# Patient Record
Sex: MALE | Race: BLACK OR AFRICAN AMERICAN | NOT HISPANIC OR LATINO | URBAN - METROPOLITAN AREA
[De-identification: names, ages, dates, MRNs, and addresses within clinical notes are randomized per-mention and may not be internally consistent; named-entity substitution may affect disease eponyms.]

---

## 2019-04-14 ENCOUNTER — EMERGENCY (EMERGENCY)
Facility: HOSPITAL | Age: 69
LOS: 1 days | Discharge: LEFT BEFORE DISCHARGE | End: 2019-04-14
Attending: EMERGENCY MEDICINE
Payer: COMMERCIAL

## 2019-04-14 VITALS
OXYGEN SATURATION: 96 % | SYSTOLIC BLOOD PRESSURE: 156 MMHG | DIASTOLIC BLOOD PRESSURE: 83 MMHG | RESPIRATION RATE: 20 BRPM | WEIGHT: 250 LBS | HEART RATE: 80 BPM | TEMPERATURE: 98 F | HEIGHT: 74 IN

## 2019-04-14 VITALS
DIASTOLIC BLOOD PRESSURE: 89 MMHG | TEMPERATURE: 98 F | SYSTOLIC BLOOD PRESSURE: 145 MMHG | OXYGEN SATURATION: 98 % | RESPIRATION RATE: 16 BRPM | HEART RATE: 82 BPM

## 2019-04-14 LAB
ALBUMIN SERPL ELPH-MCNC: 4.3 G/DL — SIGNIFICANT CHANGE UP (ref 3.3–5)
ALP SERPL-CCNC: 48 U/L — SIGNIFICANT CHANGE UP (ref 40–120)
ALT FLD-CCNC: 31 U/L — SIGNIFICANT CHANGE UP (ref 10–45)
ANION GAP SERPL CALC-SCNC: 15 MMOL/L — SIGNIFICANT CHANGE UP (ref 5–17)
APTT BLD: 30.4 SEC — SIGNIFICANT CHANGE UP (ref 27.5–36.3)
AST SERPL-CCNC: 34 U/L — SIGNIFICANT CHANGE UP (ref 10–40)
BASOPHILS # BLD AUTO: 0 K/UL — SIGNIFICANT CHANGE UP (ref 0–0.2)
BASOPHILS NFR BLD AUTO: 0.1 % — SIGNIFICANT CHANGE UP (ref 0–2)
BILIRUB SERPL-MCNC: 0.5 MG/DL — SIGNIFICANT CHANGE UP (ref 0.2–1.2)
BUN SERPL-MCNC: 14 MG/DL — SIGNIFICANT CHANGE UP (ref 7–23)
CALCIUM SERPL-MCNC: 9.6 MG/DL — SIGNIFICANT CHANGE UP (ref 8.4–10.5)
CHLORIDE SERPL-SCNC: 100 MMOL/L — SIGNIFICANT CHANGE UP (ref 96–108)
CO2 SERPL-SCNC: 25 MMOL/L — SIGNIFICANT CHANGE UP (ref 22–31)
CREAT SERPL-MCNC: 0.99 MG/DL — SIGNIFICANT CHANGE UP (ref 0.5–1.3)
EOSINOPHIL # BLD AUTO: 0 K/UL — SIGNIFICANT CHANGE UP (ref 0–0.5)
EOSINOPHIL NFR BLD AUTO: 0 % — SIGNIFICANT CHANGE UP (ref 0–6)
GLUCOSE SERPL-MCNC: 167 MG/DL — HIGH (ref 70–99)
HCT VFR BLD CALC: 49.4 % — SIGNIFICANT CHANGE UP (ref 39–50)
HGB BLD-MCNC: 16.1 G/DL — SIGNIFICANT CHANGE UP (ref 13–17)
INR BLD: 1.2 RATIO — HIGH (ref 0.88–1.16)
LYMPHOCYTES # BLD AUTO: 1.1 K/UL — SIGNIFICANT CHANGE UP (ref 1–3.3)
LYMPHOCYTES # BLD AUTO: 13.9 % — SIGNIFICANT CHANGE UP (ref 13–44)
MCHC RBC-ENTMCNC: 27 PG — SIGNIFICANT CHANGE UP (ref 27–34)
MCHC RBC-ENTMCNC: 32.6 GM/DL — SIGNIFICANT CHANGE UP (ref 32–36)
MCV RBC AUTO: 82.7 FL — SIGNIFICANT CHANGE UP (ref 80–100)
MONOCYTES # BLD AUTO: 0.3 K/UL — SIGNIFICANT CHANGE UP (ref 0–0.9)
MONOCYTES NFR BLD AUTO: 3.9 % — SIGNIFICANT CHANGE UP (ref 2–14)
NEUTROPHILS # BLD AUTO: 6.4 K/UL — SIGNIFICANT CHANGE UP (ref 1.8–7.4)
NEUTROPHILS NFR BLD AUTO: 82 % — HIGH (ref 43–77)
PLATELET # BLD AUTO: 200 K/UL — SIGNIFICANT CHANGE UP (ref 150–400)
POTASSIUM SERPL-MCNC: 4.1 MMOL/L — SIGNIFICANT CHANGE UP (ref 3.5–5.3)
POTASSIUM SERPL-SCNC: 4.1 MMOL/L — SIGNIFICANT CHANGE UP (ref 3.5–5.3)
PROT SERPL-MCNC: 7.9 G/DL — SIGNIFICANT CHANGE UP (ref 6–8.3)
PROTHROM AB SERPL-ACNC: 13.9 SEC — HIGH (ref 10–12.9)
RBC # BLD: 5.96 M/UL — HIGH (ref 4.2–5.8)
RBC # FLD: 13 % — SIGNIFICANT CHANGE UP (ref 10.3–14.5)
SODIUM SERPL-SCNC: 140 MMOL/L — SIGNIFICANT CHANGE UP (ref 135–145)
WBC # BLD: 7.8 K/UL — SIGNIFICANT CHANGE UP (ref 3.8–10.5)
WBC # FLD AUTO: 7.8 K/UL — SIGNIFICANT CHANGE UP (ref 3.8–10.5)

## 2019-04-14 PROCEDURE — 70496 CT ANGIOGRAPHY HEAD: CPT

## 2019-04-14 PROCEDURE — 96374 THER/PROPH/DIAG INJ IV PUSH: CPT | Mod: XU

## 2019-04-14 PROCEDURE — 70496 CT ANGIOGRAPHY HEAD: CPT | Mod: 26

## 2019-04-14 PROCEDURE — 70498 CT ANGIOGRAPHY NECK: CPT

## 2019-04-14 PROCEDURE — 70498 CT ANGIOGRAPHY NECK: CPT | Mod: 26

## 2019-04-14 PROCEDURE — 70450 CT HEAD/BRAIN W/O DYE: CPT

## 2019-04-14 PROCEDURE — 93010 ELECTROCARDIOGRAM REPORT: CPT

## 2019-04-14 PROCEDURE — 80053 COMPREHEN METABOLIC PANEL: CPT

## 2019-04-14 PROCEDURE — 99284 EMERGENCY DEPT VISIT MOD MDM: CPT | Mod: 25

## 2019-04-14 PROCEDURE — 85730 THROMBOPLASTIN TIME PARTIAL: CPT

## 2019-04-14 PROCEDURE — 85027 COMPLETE CBC AUTOMATED: CPT

## 2019-04-14 PROCEDURE — 85610 PROTHROMBIN TIME: CPT

## 2019-04-14 PROCEDURE — 93005 ELECTROCARDIOGRAM TRACING: CPT

## 2019-04-14 RX ORDER — MECLIZINE HCL 12.5 MG
25 TABLET ORAL ONCE
Qty: 0 | Refills: 0 | Status: COMPLETED | OUTPATIENT
Start: 2019-04-14 | End: 2019-04-14

## 2019-04-14 RX ORDER — SODIUM CHLORIDE 9 MG/ML
1000 INJECTION INTRAMUSCULAR; INTRAVENOUS; SUBCUTANEOUS ONCE
Qty: 0 | Refills: 0 | Status: COMPLETED | OUTPATIENT
Start: 2019-04-14 | End: 2019-04-14

## 2019-04-14 RX ORDER — MECLIZINE HCL 12.5 MG
1 TABLET ORAL
Qty: 6 | Refills: 0 | OUTPATIENT
Start: 2019-04-14 | End: 2019-04-16

## 2019-04-14 RX ORDER — DIAZEPAM 5 MG
5 TABLET ORAL ONCE
Qty: 0 | Refills: 0 | Status: DISCONTINUED | OUTPATIENT
Start: 2019-04-14 | End: 2019-04-14

## 2019-04-14 RX ORDER — ONDANSETRON 8 MG/1
4 TABLET, FILM COATED ORAL ONCE
Qty: 0 | Refills: 0 | Status: COMPLETED | OUTPATIENT
Start: 2019-04-14 | End: 2019-04-14

## 2019-04-14 RX ORDER — ONDANSETRON 8 MG/1
1 TABLET, FILM COATED ORAL
Qty: 9 | Refills: 0 | OUTPATIENT
Start: 2019-04-14 | End: 2019-04-16

## 2019-04-14 RX ADMIN — SODIUM CHLORIDE 1000 MILLILITER(S): 9 INJECTION INTRAMUSCULAR; INTRAVENOUS; SUBCUTANEOUS at 08:25

## 2019-04-14 RX ADMIN — Medication 25 MILLIGRAM(S): at 08:24

## 2019-04-14 RX ADMIN — Medication 5 MILLIGRAM(S): at 12:21

## 2019-04-14 RX ADMIN — ONDANSETRON 4 MILLIGRAM(S): 8 TABLET, FILM COATED ORAL at 08:24

## 2019-04-14 RX ADMIN — Medication 25 MILLIGRAM(S): at 12:21

## 2019-04-14 NOTE — CONSULT NOTE ADULT - ASSESSMENT
70 yo M with PMH of HTN presents to the ER with acute onset of vertigo. Neurological examination is pertinent for marked vertical/torsional nystagmus on left head tilt. CT head and CT angio of head and neck are within normal limits.    Impression: Peripheral vertigo, could be vestibular neuritis (less likely) vs BPPV. Less likely central cause for vertigo (stroke) given clear history of positional change aggravating symptoms, along with positive [place]-Hallpike on the left.    Plan:  -c/w meclizine  -can give valium as needed 70 yo M with PMH of HTN presents to the ER with acute onset of vertigo. Neurological examination is pertinent for marked vertical/torsional nystagmus on left head tilt. CT head and CT angio of head and neck are within normal limits.    Impression: Peripheral vertigo, could be vestibular neuritis (less likely) vs BPPV. Less likely central cause for vertigo (stroke) given clear history of positional change aggravating symptoms, along with positive [place]-Hallpike on the left.    Plan:  -c/w meclizine  -can give valium as needed     case discussed with Dr. Evan Medrano, attending neurologist

## 2019-04-14 NOTE — ED PROVIDER NOTE - CLINICAL SUMMARY MEDICAL DECISION MAKING FREE TEXT BOX
69M hx htn, paroxysmal afib not on AC p/w vertigo. non focal exam, although patient is very unsteady on his feet. Will get CT/CTA head/neck for eval central vertigo. Neuro consult, reassess

## 2019-04-14 NOTE — ED ADULT NURSE NOTE - NSIMPLEMENTINTERV_GEN_ALL_ED
Implemented All Fall Risk Interventions:  [place] to call system. Call bell, personal items and telephone within reach. Instruct patient to call for assistance. Room bathroom lighting operational. Non-slip footwear when patient is off stretcher. Physically safe environment: no spills, clutter or unnecessary equipment. Stretcher in lowest position, wheels locked, appropriate side rails in place. Provide visual cue, wrist band, yellow gown, etc. Monitor gait and stability. Monitor for mental status changes and reorient to person, place, and time. Review medications for side effects contributing to fall risk. Reinforce activity limits and safety measures with patient and family.

## 2019-04-14 NOTE — ED PROVIDER NOTE - CARDIAC, MLM
Normal rate, regular rhythm.  Heart sounds S1, S2.  No murmurs, rubs or gallops. 2+ pulses in distal extremities b/l. no obvious carotid bruits

## 2019-04-14 NOTE — ED PROVIDER NOTE - NEUROLOGICAL, MLM
Alert and oriented, no focal deficits, no motor or sensory deficits. +romberg. unable to ambulate. Drifts backward to L. negative dysmetria/dysdiadokinesia cn2-12 otherwise grossly intact, normal speech and tone Alert and oriented, no focal deficits, no motor or sensory deficits. +romberg. unable to ambulate. Drifts backward with ?truncal ataxia. negative dysmetria/dysdiadokinesia cn2-12 otherwise grossly intact, normal speech and tone

## 2019-04-14 NOTE — ED PROVIDER NOTE - ATTENDING CONTRIBUTION TO CARE
70 yo M h/o htn, presents with vertigo and unsteady gait since 6pm last night. persisted until this morning. described as room spinning, doesn't occur at rest, brought on with head or body movements. worse especially when he wants to get up/stand up. reports ataxia, feels like he's falling to the L side. + nausea and few episodes of vomiting.   dnies any visual complaints, headache, neck pain, cp, sob, weakness/numnbess  PE NEURO: pupils 3 mm, PERRL bl, EOMI bl, CN2-12 intact, finger to nose test nl bilat, normal heel-shin test bl, negative pronator drift bilat, speech is clear without dysarthria; 5/5 motor strength BUE and BLE; sensation intact to light touch BUE and BLE; unable to ambulate secondary to severe vertigo  lungs CTA. abd soft and NT. no leg swelling  patient given ivfs, zofran and meclizine in the er.

## 2019-04-14 NOTE — CONSULT NOTE ADULT - SUBJECTIVE AND OBJECTIVE BOX
ADONAY [id number] is a 69y old  Male who presents with a chief complaint of     HPI: 70 yo M with PMH of HTN presents to the ER with acute onset of vertigo. He reports that his symptoms started abruptly last night at 6 pm, where he developed dizziness and room spinning sensation. He was having difficulty with ambulation, and he reports that he was swerving to the left. No blurry vision, diplopia, dysphagia. Endorses nausea, and had few episodes of vomiting. Reports symptoms are worse with positional change, and improved by lying down. Symptoms are also noted to be worse with head turn to the left. +tinnitus b/l. Of note, he had the flu 1 month prior. Denies any weakness, numbness, tingling.          MEDICATIONS  (STANDING):    MEDICATIONS  (PRN):    PAST MEDICAL & SURGICAL HISTORY:  Paroxysmal atrial fibrillation  HTN (hypertension)    FAMILY HISTORY:    Allergies    No Known Drug Allergies  shellfish (Unknown)    Intolerances        SHx - No smoking, No ETOH, No drug abuse      Review of Systems:    see hpio      Vital Signs Last 24 Hrs  T(C): 36.9 (14 Apr 2019 12:25), Max: 36.9 (14 Apr 2019 12:25)  T(F): 98.4 (14 Apr 2019 12:25), Max: 98.4 (14 Apr 2019 12:25)  HR: 82 (14 Apr 2019 12:25) (79 - 82)  BP: 145/89 (14 Apr 2019 12:25) (145/89 - 156/83)  BP(mean): --  RR: 16 (14 Apr 2019 12:25) (16 - 20)  SpO2: 98% (14 Apr 2019 12:25) (95% - 98%)    Neurological Exam:  Mental Status: Attention intact.  No dysarthria. Speech fluent.  Cranial Nerves:  EOMI, VFF, no nystagmus on EOM assessment. CN V1-3 intact to light touch .  No facial asymmetry.  Hearing intact to finger rub bilaterally.  Tongue midline.  Sternocleidomastoid and Trapezius intact bilaterally.             Strength:     [] Upper extremity                      Delt       Bicep    Tricep                                                  R         5/5        5/5        5/5       5/5                                               L          5/5        5/5        5/5       5/5  [] Lower extremity                       HF          KE          KF        DF         PF                                               R        5/5        5/5        5/5       5/5       5/5                                               L         5/5        5/5       5/5       5/5        5/5  Pronator drift: none                 Dysmetria: None to finger-nose-finger or heel-shin-heel    rapid finger movements intact.    Tremor: No resting, postural or action tremor.  No myoclonus.    Sensation: intact to light touch    Deep Tendon Reflexes:     Biceps          Triceps      BR        Patellar        Ankle         Babinski                                         R       2+                   2+           2+            2+               2+           downgoing                                         L        2+                  2+           2+            2+               2+           downgoing    Gait: marked difficulty with ambulation, could only walk a few steps.      [place]-Hallpike: marked vertical/torsional nystagmus on left head tilt that improves after 7-10 seconds. Nystagmus not present on right head tilt.    Other:    04-14    140  |  100  |  14  ----------------------------<  167<H>  4.1   |  25  |  0.99    Ca    9.6      14 Apr 2019 08:22    TPro  7.9  /  Alb  4.3  /  TBili  0.5  /  DBili  x   /  AST  34  /  ALT  31  /  AlkPhos  48  04-14                            16.1   7.8   )-----------( 200      ( 14 Apr 2019 08:22 )             49.4       Radiology    CT: < from: CT Angio Neck w/ IV Cont (04.14.19 @ 10:08) >    IMPRESSION:  CT head imaging is within normal limits.  Intracranial and extracranial CT angiography are within normal limits. In   particular, there is no evidence for significant vertebrobasilar   stenosis. Imaging however, is technically limited.      < end of copied text >    MRI  EKG:  tele:  TTE:  EEG: ADONAY [id number] is a 69y old  Male who presents with a chief complaint of     HPI: 70 yo M with PMH of HTN, paroxysmal a-fib not on AC, presents to the ER with acute onset of vertigo. He reports that his symptoms started abruptly last night at 6 pm, where he developed dizziness and room spinning sensation. He was having difficulty with ambulation, and he reports that he was swerving to the left. No blurry vision, diplopia, dysphagia. Endorses nausea, and had few episodes of vomiting. Reports symptoms are worse with positional change, and improved by lying down. Symptoms are also noted to be worse with head turn to the left. +tinnitus b/l. Of note, he had the flu 1 month prior. Denies any weakness, numbness, tingling.          MEDICATIONS  (STANDING):    MEDICATIONS  (PRN):    PAST MEDICAL & SURGICAL HISTORY:  Paroxysmal atrial fibrillation  HTN (hypertension)    FAMILY HISTORY:    Allergies    No Known Drug Allergies  shellfish (Unknown)    Intolerances        SHx - No smoking, No ETOH, No drug abuse      Review of Systems:    see hpio      Vital Signs Last 24 Hrs  T(C): 36.9 (14 Apr 2019 12:25), Max: 36.9 (14 Apr 2019 12:25)  T(F): 98.4 (14 Apr 2019 12:25), Max: 98.4 (14 Apr 2019 12:25)  HR: 82 (14 Apr 2019 12:25) (79 - 82)  BP: 145/89 (14 Apr 2019 12:25) (145/89 - 156/83)  BP(mean): --  RR: 16 (14 Apr 2019 12:25) (16 - 20)  SpO2: 98% (14 Apr 2019 12:25) (95% - 98%)    Neurological Exam:  Mental Status: Attention intact.  No dysarthria. Speech fluent.  Cranial Nerves:  EOMI, VFF, no nystagmus on EOM assessment. CN V1-3 intact to light touch .  No facial asymmetry.  Hearing intact to finger rub bilaterally.  Tongue midline.  Sternocleidomastoid and Trapezius intact bilaterally.             Strength:     [] Upper extremity                      Delt       Bicep    Tricep                                                  R         5/5        5/5        5/5       5/5                                               L          5/5        5/5        5/5       5/5  [] Lower extremity                       HF          KE          KF        DF         PF                                               R        5/5        5/5        5/5       5/5       5/5                                               L         5/5        5/5       5/5       5/5        5/5  Pronator drift: none                 Dysmetria: None to finger-nose-finger or heel-shin-heel    rapid finger movements intact.    Tremor: No resting, postural or action tremor.  No myoclonus.    Sensation: intact to light touch    Deep Tendon Reflexes:     Biceps          Triceps      BR        Patellar        Ankle         Babinski                                         R       2+                   2+           2+            2+               2+           downgoing                                         L        2+                  2+           2+            2+               2+           downgoing    Gait: marked difficulty with ambulation, could only walk a few steps.      [place]-Hallpike: marked vertical/torsional nystagmus on left head tilt that improves after 7-10 seconds. Nystagmus not present on right head tilt.    Other:    04-14    140  |  100  |  14  ----------------------------<  167<H>  4.1   |  25  |  0.99    Ca    9.6      14 Apr 2019 08:22    TPro  7.9  /  Alb  4.3  /  TBili  0.5  /  DBili  x   /  AST  34  /  ALT  31  /  AlkPhos  48  04-14                            16.1   7.8   )-----------( 200      ( 14 Apr 2019 08:22 )             49.4       Radiology    CT: < from: CT Angio Neck w/ IV Cont (04.14.19 @ 10:08) >    IMPRESSION:  CT head imaging is within normal limits.  Intracranial and extracranial CT angiography are within normal limits. In   particular, there is no evidence for significant vertebrobasilar   stenosis. Imaging however, is technically limited.      < end of copied text >    MRI  EKG:  tele:  TTE:  EEG:

## 2019-04-14 NOTE — ED PROVIDER NOTE - NSFOLLOWUPINSTRUCTIONS_ED_ALL_ED_FT
Please schedule an apponitment with neurology as soon as possible. Return to the ER if you develop worsening symptoms, weakness, headache, persistent vomiting, difficulty speaking, swallowing, or have any other concerns.

## 2019-04-14 NOTE — ED PROVIDER NOTE - OBJECTIVE STATEMENT
69M hx htn, paroxysmal afib not on AC p/w vertigo. Patient reports gradual onset room spinning dizziness associated with nausea beginning around 6pm last night. Worsening now to the point where he cannot walk. Feels unbalanced while sitting and standing on feet. Vertigo is constant and present while laying down and standing up/moving. Had some ringing in his ears yesterday that has resolved. Denies recent URIs/infections, head trauma, headache, focal weakness, difficulty speaking/swallowing, no other complaints.

## 2019-04-14 NOTE — ED ADULT NURSE NOTE - OBJECTIVE STATEMENT
69y m pt c/o "vertigo"; pt states was a gradual onset yesterday around 5:30 after returning home after seeing a play; pt describes room spinning/dizziness and nausea; pt states worse today; unable to ambulate unassisted; pt states balance is off even when sitting and lying down; aox3; no resp distress; no chest pain; no abd pain; no v/d; no fever/chills; no cough/congestion; iv placed; labs drawn; pt medicated per md order; spouse at bedside; safety/comfort maintained

## 2019-04-14 NOTE — ED PROVIDER NOTE - CONSTITUTIONAL, MLM
normal... Closed eyes on bed. appears uncomfortable. Otherwise Well appearing, well nourished, awake, alert, oriented to person, place, time/situation and in no apparent distress.

## 2019-04-14 NOTE — ED PROVIDER NOTE - ENMT, MLM
Airway patent, Nasal mucosa clear. Mouth with normal mucosa. Throat has no vesicles, no oropharyngeal exudates and uvula is midline. TMI wnl

## 2019-04-14 NOTE — ED PROVIDER NOTE - NSFOLLOWUPCLINICS_GEN_ALL_ED_FT
[facility]  Neurology  [address] [place] [address] [address] [place] [address]  Phone: [phone]  Fax:   Follow Up Time:

## 2019-04-14 NOTE — ED PROVIDER NOTE - PROGRESS NOTE DETAILS
Patient states that he feels much improved. Has been cleared by neuro - likely peripheral vertigo. Patient wants to leave. Recommended that patient stay until he feels more steady on his feet and an MRI. He will leave ama because still unsteady on his feet. Patient has full capacity and understands risk and benefits of leaving including falling/head bleed, death, etc. Will give neuro followup and strict return precautions. Patient states that he will make an appointment with Dr. Chi tomorrow and schedule MRI with him.